# Patient Record
Sex: FEMALE | Race: BLACK OR AFRICAN AMERICAN | NOT HISPANIC OR LATINO | URBAN - METROPOLITAN AREA
[De-identification: names, ages, dates, MRNs, and addresses within clinical notes are randomized per-mention and may not be internally consistent; named-entity substitution may affect disease eponyms.]

---

## 2022-07-24 ENCOUNTER — HOSPITAL ENCOUNTER (EMERGENCY)
Facility: HOSPITAL | Age: 52
Discharge: HOME | End: 2022-07-24
Attending: SPECIALIST
Payer: MEDICAID

## 2022-07-24 VITALS
TEMPERATURE: 98 F | WEIGHT: 123 LBS | RESPIRATION RATE: 18 BRPM | OXYGEN SATURATION: 100 % | BODY MASS INDEX: 19.77 KG/M2 | SYSTOLIC BLOOD PRESSURE: 122 MMHG | HEART RATE: 77 BPM | DIASTOLIC BLOOD PRESSURE: 76 MMHG | HEIGHT: 66 IN

## 2022-07-24 DIAGNOSIS — J06.9 VIRAL URI WITH COUGH: Primary | ICD-10-CM

## 2022-07-24 LAB
FLUAV AG UPPER RESP QL IA.RAPID: NOT DETECTED
FLUBV AG UPPER RESP QL IA.RAPID: NOT DETECTED
SARS-COV-2 RNA RESP QL NAA+PROBE: NOT DETECTED

## 2022-07-24 PROCEDURE — 99283 EMERGENCY DEPT VISIT LOW MDM: CPT

## 2022-07-24 PROCEDURE — 63600175 PHARM REV CODE 636 W HCPCS: Performed by: SPECIALIST

## 2022-07-24 PROCEDURE — 87636 SARSCOV2 & INF A&B AMP PRB: CPT | Performed by: STUDENT IN AN ORGANIZED HEALTH CARE EDUCATION/TRAINING PROGRAM

## 2022-07-24 RX ORDER — PREDNISONE 20 MG/1
40 TABLET ORAL
Status: COMPLETED | OUTPATIENT
Start: 2022-07-24 | End: 2022-07-24

## 2022-07-24 RX ORDER — PREDNISONE 20 MG/1
20 TABLET ORAL 2 TIMES DAILY
Qty: 10 TABLET | Refills: 0 | Status: SHIPPED | OUTPATIENT
Start: 2022-07-24 | End: 2022-07-29

## 2022-07-24 RX ADMIN — PREDNISONE 40 MG: 20 TABLET ORAL at 06:07

## 2022-07-25 NOTE — ED PROVIDER NOTES
Encounter Date: 7/24/2022       History     Chief Complaint   Patient presents with    Cough     Cough, body aches and congestion x 1 day -pt exposed to COVID      Patient complains of dry cough, body aches and congestion over the last 24 hours and reports being exposed to COVID; no shortness of breath, fever or chills    The history is provided by the patient.   Cough  This is a new problem. The current episode started yesterday. The problem occurs constantly. The problem has been unchanged.     Review of patient's allergies indicates:  No Known Allergies  History reviewed. No pertinent past medical history.  Past Surgical History:   Procedure Laterality Date    APPENDECTOMY      CHOLECYSTECTOMY      COLON SURGERY       No family history on file.  Social History     Tobacco Use    Smoking status: Current Every Day Smoker     Packs/day: 0.50     Types: Cigarettes    Smokeless tobacco: Never Used   Substance Use Topics    Alcohol use: Not Currently     Comment: occas    Drug use: Yes     Types: Marijuana     Review of Systems   Constitutional: Negative.    HENT: Negative.    Respiratory: Positive for cough.    Cardiovascular: Negative.    Gastrointestinal: Negative.    Musculoskeletal: Negative.    Skin: Negative.    Neurological: Negative.    All other systems reviewed and are negative.      Physical Exam     Initial Vitals [07/24/22 1706]   BP Pulse Resp Temp SpO2   122/76 77 18 98.2 °F (36.8 °C) 100 %      MAP       --         Physical Exam    Nursing note and vitals reviewed.  Constitutional: She appears well-developed and well-nourished.   HENT:   Head: Normocephalic and atraumatic.   Nose: Nose normal.   Mouth/Throat: Oropharynx is clear and moist.   Eyes: EOM are normal. Pupils are equal, round, and reactive to light.   Neck: Neck supple.   Normal range of motion.  Cardiovascular: Normal rate, regular rhythm, normal heart sounds and intact distal pulses.   Pulmonary/Chest: Breath sounds normal. No  respiratory distress. She has no wheezes. She has no rhonchi.   Abdominal: Abdomen is soft. Bowel sounds are normal. She exhibits no distension. There is no abdominal tenderness.   Musculoskeletal:         General: Normal range of motion.      Cervical back: Normal range of motion and neck supple.     Neurological: She is alert and oriented to person, place, and time. She has normal strength.   Skin: Skin is warm and dry.         ED Course   Procedures  Labs Reviewed   COVID/FLU A&B PCR - Normal          Imaging Results    None          Medications   predniSONE tablet 40 mg (40 mg Oral Given 7/24/22 5543)                          Clinical Impression:   Final diagnoses:  [J06.9] Viral URI with cough (Primary)          ED Disposition Condition    Discharge Stable        ED Prescriptions     Medication Sig Dispense Start Date End Date Auth. Provider    predniSONE (DELTASONE) 20 MG tablet Take 1 tablet (20 mg total) by mouth 2 (two) times daily. for 5 days 10 tablet 7/24/2022 7/29/2022 Wan Guzmán MD        Follow-up Information     Follow up With Specialties Details Why Contact Info    [facility] - Emergency Dept Emergency Medicine  As needed [address] [facility] [id number] [phone]           Wan Guzmán MD  07/24/22 2043

## 2022-12-08 ENCOUNTER — HOSPITAL ENCOUNTER (EMERGENCY)
Facility: HOSPITAL | Age: 52
Discharge: HOME | End: 2022-12-08
Attending: STUDENT IN AN ORGANIZED HEALTH CARE EDUCATION/TRAINING PROGRAM

## 2022-12-08 VITALS
OXYGEN SATURATION: 100 % | BODY MASS INDEX: 20.98 KG/M2 | RESPIRATION RATE: 20 BRPM | HEART RATE: 80 BPM | DIASTOLIC BLOOD PRESSURE: 75 MMHG | WEIGHT: 130 LBS | SYSTOLIC BLOOD PRESSURE: 106 MMHG | TEMPERATURE: 98 F

## 2022-12-08 DIAGNOSIS — M62.838 TRAPEZIUS MUSCLE SPASM: Primary | ICD-10-CM

## 2022-12-08 PROCEDURE — 99283 EMERGENCY DEPT VISIT LOW MDM: CPT

## 2022-12-08 RX ORDER — TIZANIDINE 4 MG/1
4 TABLET ORAL 3 TIMES DAILY PRN
Qty: 15 TABLET | Refills: 0 | Status: SHIPPED | OUTPATIENT
Start: 2022-12-08 | End: 2022-12-13

## 2022-12-08 NOTE — ED PROVIDER NOTES
Encounter Date: 12/8/2022       History     Chief Complaint   Patient presents with    Shoulder Pain     Pt reports of left shoulder pain, denies any injury. Has been taking ibuprofen 800 mg has not been working.      52-year-old female presents to ER complaining of left shoulder pain and muscle spasm.  She denies any injury.  She reports no shortness of breath or vomiting.  She denies chest pain.  Pain is worse with palpation and certain movements.    The history is provided by the patient. No  was used.   Review of patient's allergies indicates:  No Known Allergies  No past medical history on file.  Past Surgical History:   Procedure Laterality Date    APPENDECTOMY      CHOLECYSTECTOMY      COLON SURGERY       No family history on file.  Social History     Tobacco Use    Smoking status: Every Day     Packs/day: 0.50     Types: Cigarettes    Smokeless tobacco: Never   Substance Use Topics    Alcohol use: Not Currently     Comment: occas    Drug use: Yes     Types: Marijuana     Review of Systems   Constitutional:  Negative for fever.   Cardiovascular:  Negative for chest pain.   Gastrointestinal:  Negative for vomiting.   Musculoskeletal:  Positive for arthralgias and myalgias.   Skin:  Negative for rash and wound.   Neurological:  Negative for dizziness.     Physical Exam     Initial Vitals [12/08/22 1120]   BP Pulse Resp Temp SpO2   106/75 80 20 98.1 °F (36.7 °C) 100 %      MAP       --         Physical Exam    Constitutional: She appears well-developed and well-nourished.   HENT:   Head: Normocephalic.   Eyes: EOM are normal.   Neck: Neck supple.   Cardiovascular:  Normal rate and intact distal pulses.           Pulmonary/Chest: Breath sounds normal. No respiratory distress.   Abdominal: She exhibits no distension.   Musculoskeletal:        Arms:       Cervical back: Neck supple.     Neurological: She is alert and oriented to person, place, and time.   Skin: Skin is warm and dry. Capillary  refill takes less than 2 seconds. No rash noted. No erythema.   Psychiatric: She has a normal mood and affect.       ED Course   Procedures  Labs Reviewed - No data to display       Imaging Results    None          Medications - No data to display  Medical Decision Making:   Differential Diagnosis:   Muscle strain, muscle spasms, cervical radiculopathy  ED Management:  Patient with spasm and tenderness over the left lateral trapezius.  No vertebral point tenderness or step-off.  She has full range motion of left arm.                        Clinical Impression:   Final diagnoses:  [M62.838] Trapezius muscle spasm (Primary)      ED Disposition Condition    Discharge Stable          ED Prescriptions       Medication Sig Dispense Start Date End Date Auth. Provider    tiZANidine (ZANAFLEX) 4 MG tablet Take 1 tablet (4 mg total) by mouth 3 (three) times daily as needed (muscle spasms). 15 tablet 12/8/2022 12/13/2022 DAJUAN Villatoro          Follow-up Information    None          DAJUAN Villatoro  12/08/22 0271

## 2022-12-08 NOTE — DISCHARGE INSTRUCTIONS
Deep heating rub such as Biofreeze or icy Hot every 2 hours while awake  Tizanidine as needed for muscle spasms, do not take in work/

## 2022-12-30 ENCOUNTER — HOSPITAL ENCOUNTER (EMERGENCY)
Facility: HOSPITAL | Age: 52
Discharge: HOME | End: 2022-12-30
Attending: STUDENT IN AN ORGANIZED HEALTH CARE EDUCATION/TRAINING PROGRAM

## 2022-12-30 VITALS
WEIGHT: 130 LBS | OXYGEN SATURATION: 97 % | SYSTOLIC BLOOD PRESSURE: 126 MMHG | HEIGHT: 67 IN | BODY MASS INDEX: 20.4 KG/M2 | HEART RATE: 94 BPM | DIASTOLIC BLOOD PRESSURE: 67 MMHG | TEMPERATURE: 99 F | RESPIRATION RATE: 18 BRPM

## 2022-12-30 DIAGNOSIS — J06.9 VIRAL URI WITH COUGH: Primary | ICD-10-CM

## 2022-12-30 DIAGNOSIS — G89.29 CHRONIC LEFT SHOULDER PAIN: ICD-10-CM

## 2022-12-30 DIAGNOSIS — M25.512 CHRONIC LEFT SHOULDER PAIN: ICD-10-CM

## 2022-12-30 LAB
FLUAV AG UPPER RESP QL IA.RAPID: NOT DETECTED
FLUBV AG UPPER RESP QL IA.RAPID: NOT DETECTED
SARS-COV-2 RNA RESP QL NAA+PROBE: NOT DETECTED
STREP A PCR (OHS): NOT DETECTED

## 2022-12-30 PROCEDURE — 96372 THER/PROPH/DIAG INJ SC/IM: CPT | Performed by: STUDENT IN AN ORGANIZED HEALTH CARE EDUCATION/TRAINING PROGRAM

## 2022-12-30 PROCEDURE — 87651 STREP A DNA AMP PROBE: CPT | Performed by: STUDENT IN AN ORGANIZED HEALTH CARE EDUCATION/TRAINING PROGRAM

## 2022-12-30 PROCEDURE — 63600175 PHARM REV CODE 636 W HCPCS: Performed by: STUDENT IN AN ORGANIZED HEALTH CARE EDUCATION/TRAINING PROGRAM

## 2022-12-30 PROCEDURE — 25000003 PHARM REV CODE 250: Performed by: STUDENT IN AN ORGANIZED HEALTH CARE EDUCATION/TRAINING PROGRAM

## 2022-12-30 PROCEDURE — 99284 EMERGENCY DEPT VISIT MOD MDM: CPT

## 2022-12-30 PROCEDURE — 0240U COVID/FLU A&B PCR: CPT | Performed by: STUDENT IN AN ORGANIZED HEALTH CARE EDUCATION/TRAINING PROGRAM

## 2022-12-30 RX ORDER — ACETAMINOPHEN 500 MG
1000 TABLET ORAL
Status: COMPLETED | OUTPATIENT
Start: 2022-12-30 | End: 2022-12-30

## 2022-12-30 RX ORDER — DEXAMETHASONE SODIUM PHOSPHATE 4 MG/ML
8 INJECTION, SOLUTION INTRA-ARTICULAR; INTRALESIONAL; INTRAMUSCULAR; INTRAVENOUS; SOFT TISSUE
Status: COMPLETED | OUTPATIENT
Start: 2022-12-30 | End: 2022-12-30

## 2022-12-30 RX ADMIN — ACETAMINOPHEN 1000 MG: 500 TABLET, FILM COATED ORAL at 08:12

## 2022-12-30 RX ADMIN — DEXAMETHASONE SODIUM PHOSPHATE 8 MG: 4 INJECTION, SOLUTION INTRA-ARTICULAR; INTRALESIONAL; INTRAMUSCULAR; INTRAVENOUS; SOFT TISSUE at 09:12

## 2022-12-30 NOTE — ED PROVIDER NOTES
Encounter Date: 12/30/2022       History     Chief Complaint   Patient presents with    Sore Throat    COVID-19 Concerns    Cough    Weakness    Headache     Pt c/o cough, bodyaches/ chills, HA. Pt currently afebrile. Pt also c/o sore throat and right shoulder pain.      52 F presents to Ed c/o cough, sore throat, HA sinus congestion, body aches x 2 days. Denies fever, n/v/cp/sob  Also still c/o persistent  L musculoskeletal shoulder pain since presenting to ED 12/8, no joint tenderness or decrease in ROM. Took muscle relaxers without relief. Denies injury or known inciting event    Review of patient's allergies indicates:  No Known Allergies  No past medical history on file.  Past Surgical History:   Procedure Laterality Date    APPENDECTOMY      CHOLECYSTECTOMY      COLON SURGERY       No family history on file.  Social History     Tobacco Use    Smoking status: Every Day     Packs/day: 0.50     Types: Cigarettes    Smokeless tobacco: Never   Substance Use Topics    Alcohol use: Not Currently     Comment: occas    Drug use: Yes     Types: Marijuana     Review of Systems   HENT:  Positive for congestion and sore throat.    Respiratory:  Positive for cough.    Musculoskeletal:  Positive for myalgias.        Shoulder pain, left   Neurological:  Positive for headaches.   All other systems reviewed and are negative.    Physical Exam     Initial Vitals [12/30/22 0758]   BP Pulse Resp Temp SpO2   133/70 107 20 98.5 °F (36.9 °C) 97 %      MAP       --         Physical Exam    Constitutional: She appears well-developed and well-nourished.   HENT:   Head: Normocephalic.   Eyes: EOM are normal. Pupils are equal, round, and reactive to light.   Neck:   Normal range of motion.  Cardiovascular:  Normal rate, regular rhythm, normal heart sounds, intact distal pulses and normal pulses.           Pulmonary/Chest: Breath sounds normal. No respiratory distress.   Abdominal: Abdomen is soft. Bowel sounds are normal. There is no  abdominal tenderness.   Musculoskeletal:         General: No edema. Normal range of motion.      Cervical back: Normal range of motion.      Comments: Mild ttp to L trapezius and lateral pectoralis mm     Neurological: She is alert.   Skin: Skin is warm. Capillary refill takes less than 2 seconds.   Psychiatric: She has a normal mood and affect.       ED Course   Procedures  Labs Reviewed   COVID/FLU A&B PCR - Normal    Narrative:     The Xpert Xpress SARS-CoV-2/FLU/RSV plus is a rapid, multiplexed real-time PCR test intended for the simultaneous qualitative detection and differentiation of SARS-CoV-2, Influenza A, Influenza B, and respiratory syncytial virus (RSV) viral RNA in either nasopharyngeal swab or nasal swab specimens.         STREP GROUP A BY PCR - Normal    Narrative:     The Xpert Xpress Strep A test is a rapid, qualitative in vitro diagnostic test for the detection of Streptococcus pyogenes (Group A ß-hemolytic Streptococcus, Strep A) in throat swab specimens from patients with signs and symptoms of pharyngitis.            Imaging Results    None          Medications   acetaminophen tablet 1,000 mg (1,000 mg Oral Given 12/30/22 0820)   dexAMETHasone injection 8 mg (8 mg Intramuscular Given 12/30/22 0918)     Medical Decision Making:   ED Management:  Given tylenol and steroid shot for muscle pain, sinus congestion, cold symtpoms. Likely viral URI  Also told to get OTC meds   Consider retesting for covid/flu if symptoms fo not resolve                        Clinical Impression:   Final diagnoses:  [J06.9] Viral URI with cough (Primary)  [M25.512, G89.29] Chronic left shoulder pain        ED Disposition Condition    Discharge Stable          ED Prescriptions    None       Follow-up Information       Follow up With Specialties Details Why Contact Info    PCP    given list of PCP references to call to establish care             Olga Peñaloza MD  12/30/22 2347

## 2022-12-30 NOTE — DISCHARGE INSTRUCTIONS
Also take over the counter flonase, claritin/zyrtec and sudafed(no longer than 3-5 days)  Increase fluid intake

## 2023-02-03 ENCOUNTER — HOSPITAL ENCOUNTER (EMERGENCY)
Facility: HOSPITAL | Age: 53
Discharge: HOME | End: 2023-02-03
Attending: GENERAL PRACTICE

## 2023-02-03 VITALS
RESPIRATION RATE: 18 BRPM | WEIGHT: 131 LBS | DIASTOLIC BLOOD PRESSURE: 69 MMHG | BODY MASS INDEX: 20.56 KG/M2 | HEIGHT: 67 IN | SYSTOLIC BLOOD PRESSURE: 103 MMHG | TEMPERATURE: 98 F | HEART RATE: 92 BPM | OXYGEN SATURATION: 100 %

## 2023-02-03 DIAGNOSIS — M54.41 RIGHT-SIDED LOW BACK PAIN WITH RIGHT-SIDED SCIATICA, UNSPECIFIED CHRONICITY: Primary | ICD-10-CM

## 2023-02-03 DIAGNOSIS — M54.2 CERVICALGIA: ICD-10-CM

## 2023-02-03 PROCEDURE — 99284 EMERGENCY DEPT VISIT MOD MDM: CPT

## 2023-02-03 RX ORDER — PREDNISONE 10 MG/1
10 TABLET ORAL DAILY
Qty: 21 TABLET | Refills: 0 | Status: SHIPPED | OUTPATIENT
Start: 2023-02-03

## 2023-02-03 RX ORDER — CYCLOBENZAPRINE HCL 10 MG
10 TABLET ORAL 3 TIMES DAILY PRN
Qty: 30 TABLET | Refills: 0 | Status: SHIPPED | OUTPATIENT
Start: 2023-02-03 | End: 2023-02-13

## 2023-02-03 RX ORDER — DICLOFENAC SODIUM 75 MG/1
75 TABLET, DELAYED RELEASE ORAL 2 TIMES DAILY
Qty: 30 TABLET | Refills: 0 | Status: SHIPPED | OUTPATIENT
Start: 2023-02-03

## 2023-02-03 NOTE — ED PROVIDER NOTES
Encounter Date: 2/3/2023       History     Chief Complaint   Patient presents with    Back Pain     Lower back pain and shoulder pain times two months seen here for it not any better worst upon movement     Pt presents to ed for neck pain and back pain. Pt has been taking meds as prescribed as well as taking tylenol. Pt has lower back pain that started about a week ago that seems like her sciatica that she has had in the past. It is mostly on her right side lower back and radiates down left thigh. No aggravating or alleviating factors. Pt has had the left sided neck for months. Nothing is helping.     The history is provided by the patient. No  was used.   Review of patient's allergies indicates:  No Known Allergies  History reviewed. No pertinent past medical history.  Past Surgical History:   Procedure Laterality Date    APPENDECTOMY      CHOLECYSTECTOMY      COLON SURGERY       History reviewed. No pertinent family history.  Social History     Tobacco Use    Smoking status: Every Day     Packs/day: 0.50     Types: Cigarettes    Smokeless tobacco: Never   Substance Use Topics    Alcohol use: Not Currently     Comment: occas    Drug use: Yes     Types: Marijuana     Review of Systems   Musculoskeletal:  Positive for arthralgias, back pain, myalgias and neck pain.   All other systems reviewed and are negative.    Physical Exam     Initial Vitals [02/03/23 0938]   BP Pulse Resp Temp SpO2   103/69 92 18 98 °F (36.7 °C) 100 %      MAP       --         Physical Exam    Constitutional: She appears well-developed and well-nourished.   HENT:   Head: Normocephalic and atraumatic.   Eyes: EOM are normal. Pupils are equal, round, and reactive to light.   Neck:   Left sided neck pain    Cardiovascular:  Normal rate, regular rhythm, normal heart sounds and intact distal pulses.           Pulmonary/Chest: Breath sounds normal.   Abdominal: Abdomen is soft. Bowel sounds are normal.   Musculoskeletal:       Comments: Right lower back pain with right thigh pain     Left neck pain and tightness      Neurological: She is alert and oriented to person, place, and time. She has normal strength. GCS score is 15. GCS eye subscore is 4. GCS verbal subscore is 5. GCS motor subscore is 6.   Skin: Skin is warm.   Psychiatric: She has a normal mood and affect.       ED Course   Procedures  Labs Reviewed - No data to display       Imaging Results    None          Medications - No data to display  Medical Decision Making:   History:   Old Medical Records: I decided to obtain old medical records.  Initial Assessment:   Pt with sciatica and chronic neck pain   Differential Diagnosis:   Sciatica   Lumbago   Stained muscle   Muscle spasm   Cervicalgia                         Clinical Impression:   Final diagnoses:  [M54.41] Right-sided low back pain with right-sided sciatica, unspecified chronicity (Primary)  [M54.2] Cervicalgia        ED Disposition Condition    Discharge Stable          ED Prescriptions    None       Follow-up Information       Follow up With Specialties Details Why Contact Info    pcp  In 1 week               Anson Gama MD  02/03/23 0736

## 2023-02-03 NOTE — DISCHARGE INSTRUCTIONS
Follow up with pcp in 1 week     Take as meds as prescribed   Take prednisone first once complete then start diclofenac     Return if symptoms worsen or as needed

## 2025-01-09 ENCOUNTER — HOSPITAL ENCOUNTER (EMERGENCY)
Facility: HOSPITAL | Age: 55
Discharge: HOME | End: 2025-01-09
Attending: EMERGENCY MEDICINE
Payer: COMMERCIAL

## 2025-01-09 VITALS
HEIGHT: 62 IN | WEIGHT: 130 LBS | TEMPERATURE: 98 F | HEART RATE: 78 BPM | OXYGEN SATURATION: 98 % | SYSTOLIC BLOOD PRESSURE: 112 MMHG | DIASTOLIC BLOOD PRESSURE: 68 MMHG | BODY MASS INDEX: 23.92 KG/M2 | RESPIRATION RATE: 18 BRPM

## 2025-01-09 DIAGNOSIS — R05.9 COUGH: ICD-10-CM

## 2025-01-09 DIAGNOSIS — R05.9 COUGH, UNSPECIFIED TYPE: Primary | ICD-10-CM

## 2025-01-09 DIAGNOSIS — J40 BRONCHITIS: ICD-10-CM

## 2025-01-09 DIAGNOSIS — M54.9 BACK PAIN, UNSPECIFIED BACK LOCATION, UNSPECIFIED BACK PAIN LATERALITY, UNSPECIFIED CHRONICITY: ICD-10-CM

## 2025-01-09 PROCEDURE — 63600175 PHARM REV CODE 636 W HCPCS: Performed by: PHYSICIAN ASSISTANT

## 2025-01-09 PROCEDURE — 96372 THER/PROPH/DIAG INJ SC/IM: CPT | Performed by: PHYSICIAN ASSISTANT

## 2025-01-09 PROCEDURE — 99284 EMERGENCY DEPT VISIT MOD MDM: CPT | Mod: 25

## 2025-01-09 RX ORDER — NAPROXEN 500 MG/1
500 TABLET ORAL 2 TIMES DAILY WITH MEALS
Qty: 20 TABLET | Refills: 0 | Status: SHIPPED | OUTPATIENT
Start: 2025-01-09

## 2025-01-09 RX ORDER — PROMETHAZINE HYDROCHLORIDE AND DEXTROMETHORPHAN HYDROBROMIDE 6.25; 15 MG/5ML; MG/5ML
5 SYRUP ORAL EVERY 4 HOURS PRN
Qty: 120 ML | Refills: 0 | Status: SHIPPED | OUTPATIENT
Start: 2025-01-09 | End: 2025-01-19

## 2025-01-09 RX ORDER — DEXAMETHASONE SODIUM PHOSPHATE 4 MG/ML
8 INJECTION, SOLUTION INTRA-ARTICULAR; INTRALESIONAL; INTRAMUSCULAR; INTRAVENOUS; SOFT TISSUE
Status: COMPLETED | OUTPATIENT
Start: 2025-01-09 | End: 2025-01-09

## 2025-01-09 RX ORDER — METHYLPREDNISOLONE 4 MG/1
TABLET ORAL
Qty: 1 EACH | Refills: 0 | Status: SHIPPED | OUTPATIENT
Start: 2025-01-09 | End: 2025-01-30

## 2025-01-09 RX ORDER — METHOCARBAMOL 750 MG/1
750 TABLET, FILM COATED ORAL 3 TIMES DAILY
Qty: 30 TABLET | Refills: 0 | Status: SHIPPED | OUTPATIENT
Start: 2025-01-09 | End: 2025-01-19

## 2025-01-09 RX ADMIN — DEXAMETHASONE SODIUM PHOSPHATE 8 MG: 4 INJECTION, SOLUTION INTRA-ARTICULAR; INTRALESIONAL; INTRAMUSCULAR; INTRAVENOUS; SOFT TISSUE at 01:01

## 2025-01-09 NOTE — DISCHARGE INSTRUCTIONS
Take the steroid Dosepak as prescribed.  Take the promethazine DM 1 tsp every 6 hours as needed for.  Take Naprosyn twice a day as needed for musculoskeletal pain.  Take the Robaxin 3 times a day as needed.  Follow up with your doctor in 3-4 days.  Return to the ER for worsening symptoms or condition.

## 2025-01-09 NOTE — Clinical Note
"Sofiya Castillo" Syeda was seen and treated in our emergency department on 1/9/2025.  She may return to work on 01/11/2025.       If you have any questions or concerns, please don't hesitate to call.      DAMION WHEAT    "

## 2025-01-09 NOTE — ED PROVIDER NOTES
Encounter Date: 1/9/2025       History     Chief Complaint   Patient presents with    Cough     Complains of a cough. Denies fever. Also, complains of lower back pain since Friday after falling, going down some steps    Back Pain     Patient presents to ER today with a complaint of cough, congestion.  This has been ongoing for over 2 weeks.  Patient was treated 2 weeks ago with an inhaler in antibiotics.  Cough is productive with yellow sputum.  Patient does smoke.  She is also complaining of lower back pain that has been ongoing for the past several days.  No known trauma or injury.  No loss of bowel or bladder control.  No dysuria.    The history is provided by the patient.     Review of patient's allergies indicates:  No Known Allergies  History reviewed. No pertinent past medical history.  Past Surgical History:   Procedure Laterality Date    APPENDECTOMY      CHOLECYSTECTOMY      COLON SURGERY       No family history on file.  Social History     Tobacco Use    Smoking status: Every Day     Current packs/day: 0.50     Types: Cigarettes    Smokeless tobacco: Never   Substance Use Topics    Alcohol use: Not Currently     Comment: occas    Drug use: Yes     Types: Marijuana     Review of Systems   HENT:  Positive for congestion.    Respiratory:  Positive for cough.    Musculoskeletal:  Positive for back pain.   All other systems reviewed and are negative.      Physical Exam     Initial Vitals [01/09/25 1116]   BP Pulse Resp Temp SpO2   (!) 211/41 84 18 98.1 °F (36.7 °C) 100 %      MAP       --         Physical Exam    Nursing note and vitals reviewed.  Constitutional: She appears well-developed and well-nourished.   HENT:   Head: Normocephalic and atraumatic.   Right Ear: External ear normal.   Left Ear: External ear normal. Mouth/Throat: Oropharynx is clear and moist.   Mild congestion noted   Eyes: Conjunctivae and EOM are normal. Pupils are equal, round, and reactive to light.   Neck: Neck supple.   Normal range  of motion.  Cardiovascular:  Normal rate, regular rhythm and normal heart sounds.           Pulmonary/Chest: She has rhonchi.   Musculoskeletal:         General: Normal range of motion.      Cervical back: Normal range of motion and neck supple.     Neurological: She is alert and oriented to person, place, and time. She has normal strength.   Skin: Skin is warm and dry. Capillary refill takes less than 2 seconds.   Psychiatric: She has a normal mood and affect. Her behavior is normal. Judgment and thought content normal.         ED Course   Procedures  Labs Reviewed - No data to display       Imaging Results              X-Ray Chest PA And Lateral (Final result)  Result time 01/09/25 13:07:23      Final result by Harvinder Ignacio MD (01/09/25 13:07:23)                   Impression:      No acute cardiopulmonary abnormality.      Electronically signed by: Harvinder Ignacio  Date:    01/09/2025  Time:    13:07               Narrative:    EXAMINATION:  XR CHEST PA AND LATERAL    CLINICAL HISTORY:  Cough, unspecified    COMPARISON:  2 January 2017    FINDINGS:  PA and lateral views of the chest were obtained. Heart is not enlarged.  Similar bullous changes left upper lung.  Otherwise the lungs are clear.  No pneumothorax or sizable pleural effusion.                                       Medications   dexAMETHasone injection 8 mg (has no administration in time range)     Medical Decision Making  COVID, flu, pneumonia, sinusitis, bronchitis, musculoskeletal pain, DJD, lumbar radiculopathy    Amount and/or Complexity of Data Reviewed  Radiology: ordered.     Details: Chest x-ray was negative for acute process    Risk  Prescription drug management.  Risk Details: Chest x-ray was negative.  I advised patient I feel she does not need any more antibiotics.  She was given Decadron 8 mg IM in ER today.  She is to start the Medrol Dosepak tomorrow.  She may take the Naprosyn and Robaxin as prescribed for her back pain.  Follow up with  her primary care doctor in 3-4 days.  Return to the ER for worsening symptoms or condition.  Patient verbalized understanding and agrees to treatment plan.                                      Clinical Impression:  Final diagnoses:  [R05.9] Cough  [R05.9] Cough, unspecified type (Primary)  [J40] Bronchitis  [M54.9] Back pain, unspecified back location, unspecified back pain laterality, unspecified chronicity          ED Disposition Condition    Discharge Stable          ED Prescriptions       Medication Sig Dispense Start Date End Date Auth. Provider    methylPREDNISolone (MEDROL DOSEPACK) 4 mg tablet Use as directed 1 each 1/9/2025 1/30/2025 Lisa Suarez PA    promethazine-dextromethorphan (PROMETHAZINE-DM) 6.25-15 mg/5 mL Syrp Take 5 mLs by mouth every 4 (four) hours as needed. 120 mL 1/9/2025 1/19/2025 Lisa Suarez PA    naproxen (NAPROSYN) 500 MG tablet Take 1 tablet (500 mg total) by mouth 2 (two) times daily with meals. 20 tablet 1/9/2025 -- Lisa Suarez PA    methocarbamoL (ROBAXIN) 750 MG Tab Take 1 tablet (750 mg total) by mouth 3 (three) times daily. for 10 days 30 tablet 1/9/2025 1/19/2025 Lisa Suarez PA          Follow-up Information       Follow up With Specialties Details Why Contact Info    Angel Luis Sage MD Family Medicine  3-4 days [address] [facility] [address]  [facility] [address]  [phone]               Lisa Suarez PA  01/09/25 9147

## 2025-05-31 ENCOUNTER — HOSPITAL ENCOUNTER (EMERGENCY)
Facility: HOSPITAL | Age: 55
Discharge: HOME | End: 2025-06-01
Attending: EMERGENCY MEDICINE
Payer: COMMERCIAL

## 2025-05-31 VITALS
BODY MASS INDEX: 20.09 KG/M2 | SYSTOLIC BLOOD PRESSURE: 118 MMHG | HEIGHT: 67 IN | OXYGEN SATURATION: 99 % | HEART RATE: 82 BPM | WEIGHT: 128 LBS | DIASTOLIC BLOOD PRESSURE: 78 MMHG | TEMPERATURE: 98 F | RESPIRATION RATE: 20 BRPM
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
## 2025-05-31 DIAGNOSIS — S39.012A LUMBAR STRAIN, INITIAL ENCOUNTER: ICD-10-CM

## 2025-05-31 DIAGNOSIS — S05.02XA ABRASION OF LEFT CORNEA, INITIAL ENCOUNTER: ICD-10-CM

## 2025-05-31 DIAGNOSIS — M54.50 ACUTE BILATERAL LOW BACK PAIN, UNSPECIFIED WHETHER SCIATICA PRESENT: Primary | ICD-10-CM

## 2025-05-31 PROCEDURE — 99284 EMERGENCY DEPT VISIT MOD MDM: CPT

## 2025-06-01 PROCEDURE — 96372 THER/PROPH/DIAG INJ SC/IM: CPT | Performed by: EMERGENCY MEDICINE

## 2025-06-01 PROCEDURE — 63600175 PHARM REV CODE 636 W HCPCS: Performed by: EMERGENCY MEDICINE

## 2025-06-01 PROCEDURE — 25000003 PHARM REV CODE 250: Performed by: EMERGENCY MEDICINE

## 2025-06-01 RX ORDER — ORPHENADRINE CITRATE 30 MG/ML
60 INJECTION INTRAMUSCULAR; INTRAVENOUS
Status: COMPLETED | OUTPATIENT
Start: 2025-06-01 | End: 2025-06-01

## 2025-06-01 RX ORDER — METHOCARBAMOL 500 MG/1
1000 TABLET, FILM COATED ORAL 3 TIMES DAILY
Qty: 30 TABLET | Refills: 0 | Status: SHIPPED | OUTPATIENT
Start: 2025-06-01 | End: 2025-06-06

## 2025-06-01 RX ORDER — KETOROLAC TROMETHAMINE 30 MG/ML
60 INJECTION, SOLUTION INTRAMUSCULAR; INTRAVENOUS
Status: COMPLETED | OUTPATIENT
Start: 2025-06-01 | End: 2025-06-01

## 2025-06-01 RX ORDER — TETRACAINE HYDROCHLORIDE 5 MG/ML
2 SOLUTION OPHTHALMIC
Status: COMPLETED | OUTPATIENT
Start: 2025-06-01 | End: 2025-06-01

## 2025-06-01 RX ORDER — MOXIFLOXACIN 5 MG/ML
1 SOLUTION/ DROPS OPHTHALMIC 3 TIMES DAILY
Qty: 5 ML | Refills: 0 | Status: SHIPPED | OUTPATIENT
Start: 2025-06-01

## 2025-06-01 RX ORDER — IBUPROFEN 600 MG/1
600 TABLET, FILM COATED ORAL EVERY 6 HOURS PRN
Qty: 20 TABLET | Refills: 0 | Status: SHIPPED | OUTPATIENT
Start: 2025-06-01

## 2025-06-01 RX ADMIN — FLUORESCEIN SODIUM 1 EACH: 1 STRIP OPHTHALMIC at 12:06

## 2025-06-01 RX ADMIN — TETRACAINE HYDROCHLORIDE 2 DROP: 5 SOLUTION OPHTHALMIC at 12:06

## 2025-06-01 RX ADMIN — ORPHENADRINE CITRATE 60 MG: 60 INJECTION INTRAMUSCULAR; INTRAVENOUS at 12:06

## 2025-06-01 RX ADMIN — KETOROLAC TROMETHAMINE 60 MG: 30 INJECTION, SOLUTION INTRAMUSCULAR at 12:06
